# Patient Record
Sex: MALE | Race: WHITE
[De-identification: names, ages, dates, MRNs, and addresses within clinical notes are randomized per-mention and may not be internally consistent; named-entity substitution may affect disease eponyms.]

---

## 2022-12-19 ENCOUNTER — HOSPITAL ENCOUNTER (EMERGENCY)
Dept: HOSPITAL 76 - ED | Age: 47
Discharge: HOME | End: 2022-12-19
Payer: COMMERCIAL

## 2022-12-19 VITALS — SYSTOLIC BLOOD PRESSURE: 138 MMHG | DIASTOLIC BLOOD PRESSURE: 89 MMHG

## 2022-12-19 DIAGNOSIS — Z20.822: ICD-10-CM

## 2022-12-19 DIAGNOSIS — J06.9: Primary | ICD-10-CM

## 2022-12-19 PROCEDURE — 94640 AIRWAY INHALATION TREATMENT: CPT

## 2022-12-19 PROCEDURE — 87635 SARS-COV-2 COVID-19 AMP PRB: CPT

## 2022-12-19 PROCEDURE — 99284 EMERGENCY DEPT VISIT MOD MDM: CPT

## 2022-12-19 PROCEDURE — 99282 EMERGENCY DEPT VISIT SF MDM: CPT

## 2022-12-19 PROCEDURE — 71046 X-RAY EXAM CHEST 2 VIEWS: CPT

## 2022-12-19 RX ADMIN — IPRATROPIUM BROMIDE AND ALBUTEROL SULFATE STA ML: 2.5; .5 SOLUTION RESPIRATORY (INHALATION) at 16:02

## 2022-12-19 NOTE — XRAY REPORT
PROCEDURE:  Chest 2 View X-Ray

 

INDICATIONS:  cough/soa

 

TECHNIQUE:  2 views of the chest were acquired.  

 

COMPARISON: None.    

 

FINDINGS:  

 

Surgical changes and devices:  None.  

 

Lungs and pleura:  No pleural effusions or pneumothorax.  Lungs are clear.  

 

Mediastinum:  Mediastinal contours are normal.  Heart size is normal.  

 

Bones and chest wall:  No suspicious bony abnormalities.  Soft tissues appear unremarkable.  

 

IMPRESSION:  

Chest without acute cardiopulmonary abnormalities or focal airspace disease.

 

Reviewed by: Jayro Segura MD on 12/19/2022 2:04 PM PST

Approved by: Jayro Segura MD on 12/19/2022 2:04 PM PST

 

 

Station ID:  [id number]

## 2022-12-19 NOTE — ED PHYSICIAN DOCUMENTATION
PD HPI URI





- Stated complaint


Stated Complaint: TROUBLE BREATHING





- Chief complaint


Chief Complaint: Resp





- History obtained from


History obtained from: Patient





- History of Present Illness


Timing - onset: Today


Pain level max: 0


Pain level now: 0


Associated symptoms: Nasal congestion, Rhinorrhea, Dry cough, Dyspnea.  No: 

Chills, Chest pain, NVD, Bilateral edema


Contributing factors: Sick contact, COPD / asthma


Improves by: Rest


Worsened by: Activity, Breathing





- Additional information


Additional information: 





Patient is a 47-year-old male who presents to the emergency department stating 

he has had increased work of breathing over the past several weeks.  He has had 

coughing and congestion.  Has used inhalers in the past but does not have one 

now.  Patient states that he did use marijuana and felt like this did help him 

breathe better.  He has not having any fevers or chills.  He states that the 

coughing keeps him up at night.  Concerned about potential pneumonia.





Review of Systems


Constitutional: denies: Fever, Chills


Throat: denies: Sore throat


Cardiac: denies: Chest pain / pressure, Palpitations


Respiratory: reports: Dyspnea, Cough, Wheezing


GI: denies: Abdominal Pain, Nausea, Vomiting, Diarrhea


Skin: denies: Rash


Musculoskeletal: denies: Neck pain, Back pain


Neurologic: denies: Headache





PD PAST MEDICAL HISTORY





- Past Medical History


Past Medical History: No





- Present Medications


Home Medications: 


                                Ambulatory Orders











 Medication  Instructions  Recorded  Confirmed


 


Albuterol Sulf [Ventolin Hfa 1 - 2 puffs INH Q4HR PRN #1 each 12/19/22 





Inhaler]   


 


Benzonatate [Tessalon] 200 mg PO TID PRN #30 cap 12/19/22 


 


predniSONE [Deltasone] 10 mg PO [id number] #42 tab 12/19/22 














- Allergies


Allergies/Adverse Reactions: 


                                    Allergies











Allergy/AdvReac Type Severity Reaction Status Date / Time


 


No Known Drug Allergies Allergy   Verified 12/19/22 13:40














- Social History


Does the pt smoke?: No


Smoking Status: Never smoker





PD ED PE NORMAL





- Vitals


Vital signs reviewed: Yes





- General


General: Alert and oriented X 3, No acute distress





- HEENT


HEENT: PERRL, Ears normal, Moist mucous membranes, Pharynx benign





- Neck


Neck: Supple, no meningeal sign





- Cardiac


Cardiac: RRR, Strong equal pulses





- Respiratory


Respiratory: No respiratory distress, Other (Mild wheezing bilaterally.  No 

respiratory distress.  No accessory muscle use)





- Derm


Derm: Warm and dry, No rash





- Neuro


Neuro: Alert and oriented X 3





- Psych


Psych: Normal mood, Normal affect





Results





- Vitals


Vitals: 


                               Vital Signs - 24 hr











  12/19/22 12/19/22





  13:37 16:04


 


Temperature 36.0 C L 


 


Heart Rate 79 84


 


Respiratory 20 16





Rate  


 


Blood Pressure 138/89 H 


 


O2 Saturation 96 








                                     Oxygen











O2 Source                      Room air

















- Labs


Labs: 


                                Laboratory Tests











  12/19/22





  13:43


 


SARS-CoV-2 (PCR)  NOT DETECTED














- Rads (name of study)


  ** Chest x-ray


Radiology: Final report received, See rad report





PD Medical Decision Making





- ED course


Complexity details: reviewed results, re-evaluated patient, considered 

differential, d/w patient


ED course: 





No acute findings on x-ray.  Patient is well-appearing, nontoxic.  No hypoxia.  

No respiratory distress.  Feels better after steroids and nebulizer treatment.  

Will prescribe an inhaler and cough medication for home.  We will place him on a

 steroid taper as well.  No indication for antibiotics.  He states he does take 

lisinopril but did stop it for about a month and the cough did not change.  

Patient counseled regarding signs and symptoms for which I believe and urgent 

re-evaluation would be necessary. Patient with good understanding of and 

agreement to plan and is comfortable going home at this time





This document was made in part using voice recognition software. While efforts 

are made to proofread this document, sound alike and grammatical errors may 

occur.





Departure





- Departure


Disposition: 01 Home, Self Care


Clinical Impression: 


 Viral URI





Condition: Good


Instructions:  ED URI Viral


Follow-Up: 


your,doctor in 1week [Other]


Prescriptions: 


Albuterol Sulf [Ventolin Hfa Inhaler] 1 - 2 puffs INH Q4HR PRN #1 each


 PRN Reason: Shortness Of Air/Wheezing


predniSONE [Deltasone] 10 mg PO [id number] #42 tab


Benzonatate [Tessalon] 200 mg PO TID PRN #30 cap


 PRN Reason: Cough


Comments: 


Please follow-up with your doctor for further care.  Return if you worsen.  Your

 x-ray does not show any evidence of pneumonia today.  Your prescriptions were 

sent to the [facility] pharmacy.


Discharge Date/Time: 12/19/22 16:17